# Patient Record
Sex: FEMALE | Race: WHITE | Employment: FULL TIME | URBAN - METROPOLITAN AREA
[De-identification: names, ages, dates, MRNs, and addresses within clinical notes are randomized per-mention and may not be internally consistent; named-entity substitution may affect disease eponyms.]

---

## 2022-06-22 ENCOUNTER — OFFICE VISIT (OUTPATIENT)
Dept: NEUROLOGY | Age: 37
End: 2022-06-22
Payer: COMMERCIAL

## 2022-06-22 VITALS
DIASTOLIC BLOOD PRESSURE: 80 MMHG | WEIGHT: 202 LBS | SYSTOLIC BLOOD PRESSURE: 111 MMHG | HEIGHT: 66 IN | BODY MASS INDEX: 32.47 KG/M2 | HEART RATE: 78 BPM

## 2022-06-22 DIAGNOSIS — G08 THROMBOSIS OF SUPERIOR SAGITTAL SINUS: ICD-10-CM

## 2022-06-22 DIAGNOSIS — G93.2 PSEUDOTUMOR CEREBRI: Primary | ICD-10-CM

## 2022-06-22 PROCEDURE — 99204 OFFICE O/P NEW MOD 45 MIN: CPT | Performed by: PSYCHIATRY & NEUROLOGY

## 2022-06-22 RX ORDER — ACETAZOLAMIDE 500 MG/1
CAPSULE, EXTENDED RELEASE ORAL
COMMUNITY
Start: 2022-06-21 | End: 2022-08-24

## 2022-06-22 RX ORDER — PREDNISONE 20 MG/1
TABLET ORAL
Qty: 17 TABLET | Refills: 0 | Status: SHIPPED | OUTPATIENT
Start: 2022-06-22 | End: 2022-08-24

## 2022-06-22 ASSESSMENT — ENCOUNTER SYMPTOMS
GASTROINTESTINAL NEGATIVE: 1
ALLERGIC/IMMUNOLOGIC NEGATIVE: 1

## 2022-06-22 NOTE — PROGRESS NOTES
[address], Ash[address], [address]  Phone: [phone] Fax [phone]  Dr. Lavern Hurst      6/22/2022  Erlinda Hicks     Patient is referred by the following provider for consultation regarding as below:       I reviewed the available records and notes and have examined patient with the following findings:     Chief Complaint:  Chief Complaint   Patient presents with   [facility] Care    Neurologic Problem     Papilledema          HPI: This is a right handed 40 y.o.  female who is very pleasant very appropriate very frustrated. The patient unfortunately in October 2021 she had a breast reduction. And about a week or 2 after that she started noticing she was having headaches she thought it was associated with some of the medication she had postoperatively. But it did not seem to be so when she stopped those medicines the headache continued right behind the right eye severe pain deep sharp and spreads right over the right side of her head to the right occipital region. She went to see her primary doctor they did a CT scan tried her on some antibiotics with some benefit but overall not much and went to Dr. Alicia Cross her optometrist who quickly identified she had bilateral severe papilledema the right side was much worse than the left but it was bilaterally sent her to [facility] eye group Dr. Brian Fatima went ahead and started her on Diamox 500 mg twice a day took a little bit of time but then it started resolving her headaches. In April they decided to wean off taupe Diamox and within 2 weeks she started getting headaches back on the right side and over time they are creeping over the left side at that time they did not know whether this was part of pseudotumor they did try Imitrex 50 mg which did not help. They put her back on Diamox but this time it took a couple weeks before it started kicking in and helping.   But she also started having had discomfort radiating over to the left side of her head is just the left side of her head is nowhere near as severe as the right side was. When the headaches were severe on the right side she had to go into a dark room she had photophobia no phonophobia. No nausea or vomiting. She was laying in a dark room. Now with everything shifting over the left softly but not anywhere near to that degree. IMAGING REVIEW:  I REVIEWED PERTINENT  IMAGES AND REPORTS WITH THE PATIENT PERSONALLY, DIRECTLY AND FULLY. Past Medical History:  No past medical history on file. Past Surgical History:  No past surgical history on file. Social History:  Social History     Socioeconomic History    Marital status:      Spouse name: Not on file    Number of children: Not on file    Years of education: Not on file    Highest education level: Not on file   Occupational History    Not on file   Tobacco Use    Smoking status: Not on file    Smokeless tobacco: Not on file   Substance and Sexual Activity    Alcohol use: Not on file    Drug use: Not on file    Sexual activity: Not on file   Other Topics Concern    Not on file   Social History Narrative    Not on file     Social Determinants of Health     Financial Resource Strain:     Difficulty of Paying Living Expenses: Not on file   Food Insecurity:     Worried About Running Out of Food in the Last Year: Not on file    [place] of Food in the Last Year: Not on file   Transportation Needs:     Lack of Transportation (Medical): Not on file    Lack of Transportation (Non-Medical):  Not on file   Physical Activity:     Days of Exercise per Week: Not on file    Minutes of Exercise per Session: Not on file   Stress:     Feeling of Stress : Not on file   Social Connections:     Frequency of Communication with Friends and Family: Not on file    Frequency of Social Gatherings with Friends and Family: Not on file    Attends Evangelical Services: Not on file   [employer] of Clubs or Organizations: Not on file    Attends Club or Organization Meetings: Not on file    Marital Status: Not on file   Intimate Partner Violence:     Fear of Current or Ex-Partner: Not on file    Emotionally Abused: Not on file    Physically Abused: Not on file    Sexually Abused: Not on file   Housing Stability:     Unable to Pay for Housing in the Last Year: Not on file    Number of [place] in the Last Year: Not on file    Unstable Housing in the Last Year: Not on file       Family History:   No family history on file. Current Outpatient Medications on File Prior to Visit   Medication Sig Dispense Refill    acetaZOLAMIDE (DIAMOX) 500 MG extended release capsule Take 1 cap BID      medroxyPROGESTERone Acetate (DEPO-PROVERA IM) Inject into the muscle Q 3 months       No current facility-administered medications on file prior to visit. Allergies   Allergen Reactions    Penicillins Other (See Comments)     Can take Z-pack ok  Can take Z-pack ok         Review of Systems:  Review of Systems   Constitutional: Negative. HENT: Negative. Eyes: Positive for visual disturbance. Cardiovascular: Negative. Gastrointestinal: Negative. Endocrine: Negative. Genitourinary: Negative. Musculoskeletal: Negative. Skin: Negative. Allergic/Immunologic: Negative. Neurological: Positive for headaches. Hematological: Negative. Psychiatric/Behavioral: Negative. No flowsheet data found. No flowsheet data found. Vitals:    06/22/22 0857   BP: 111/80   Site: Left Upper Arm   Position: Sitting   Pulse: 78   Weight: 202 lb (91.6 kg)   Height: 5' 6\" (1.676 m)        Physical Exam  Constitutional:       Appearance: Normal appearance. HENT:      Head: Normocephalic and atraumatic. Eyes:      Extraocular Movements: Extraocular movements intact and EOM normal.      Pupils: Pupils are equal, round, and reactive to light.    Cardiovascular:      Rate and Rhythm: Normal rate and regular rhythm. Pulses: Normal pulses. Pulmonary:      Effort: Pulmonary effort is normal.   Abdominal:      General: Abdomen is flat. Neurological:      Mental Status: She is alert and oriented to person, place, and time. Gait: Gait is intact. Deep Tendon Reflexes:      Reflex Scores:       Tricep reflexes are 1+ on the right side and 1+ on the left side. Bicep reflexes are 1+ on the right side and 1+ on the left side. Brachioradialis reflexes are 1+ on the right side and 1+ on the left side. Patellar reflexes are 1+ on the right side and 1+ on the left side. Achilles reflexes are 1+ on the right side and 1+ on the left side. Neurologic Exam     Mental Status   Oriented to person, place, and time. Attention: normal. Concentration: normal.   Level of consciousness: alert  Knowledge: good. Cranial Nerves     CN II   Visual fields full to confrontation. CN III, IV, VI   Pupils are equal, round, and reactive to light. Extraocular motions are normal.     CN VII   Facial expression full, symmetric. I cannot appreciate a funduscopic exam to the back of her eyes the pupils shut down quickly     Motor Exam   Right arm tone: normal  Left arm tone: normal  Right leg tone: normal  Left leg tone: normal    Gait, Coordination, and Reflexes     Gait  Gait: normal    Tremor   Resting tremor: absent  Intention tremor: absent  Action tremor: absent    Reflexes   Right brachioradialis: 1+  Left brachioradialis: 1+  Right biceps: 1+  Left biceps: 1+  Right triceps: 1+  Left triceps: 1+  Right patellar: 1+  Left patellar: 1+  Right achilles: 1+  Left achilles: 1+          Assessment   Assessment / Plan:    Niharika Morel was seen today for establish care and neurologic problem. Diagnoses and all orders for this visit:    Pseudotumor cerebri the patient clearly has pseudotumor cerebri.   The ophthalmologic evaluation showed bilateral papilledema she had headaches on the right side of her head and right behind the right eye. Diamox she responded to initially and the second time it took little bit longer. Now she has a new discomfort on the left side of her head that is headache and pain but it is different than the pseudotumor pain. This simply may not even be associated with the pseudotumor pain. At this time I Zaire Lopes hit her with steroids to try to break this left-sided head discomfort. She is going to immediately get together with her ophthalmologist and have her eyes checked to make sure the papilledema is no longer there but if it is they are working to move forward with a spinal tap. An MRV would be greatly of benefit to make sure there is no sagittal sinus thrombosis. Other orders  -     predniSONE (DELTASONE) 20 MG tablet; Take 3 po q day for 3 days than 2 po q day for 3 days than 1 po q day for 2 days than stop        The Diagnosis and differential diagnostic considerations, and Rx Tx were reviewed with the patient at length. No orders of the defined types were placed in this encounter. I have spent greater than 50% of visit discussing and counseling of patient  for treatment and diagnostic plan review. Total time 45 min     . Notes: Patient is to continue all medications as directed by prescribing physicians. Continuations on today's visit are made based on the patient's report of current medications.              Dr. Mp Mora  Consultation Neurology, Neurodiagnostics and Neurotherapeutics  Neuroelectrophysiology, EEG, EMG  [facility]  [address] [place] [address], [address] [facility]  Phone:  [phone]  Fax:   [phone]

## 2022-07-05 ENCOUNTER — HOSPITAL ENCOUNTER (OUTPATIENT)
Dept: MRI IMAGING | Age: 37
Discharge: HOME | End: 2022-07-08

## 2022-07-05 DIAGNOSIS — G08 THROMBOSIS OF SUPERIOR SAGITTAL SINUS: ICD-10-CM

## 2022-07-05 DIAGNOSIS — G93.2 PSEUDOTUMOR CEREBRI: ICD-10-CM

## 2022-07-14 ENCOUNTER — CLINICAL DOCUMENTATION (OUTPATIENT)
Dept: NEUROLOGY | Age: 37
End: 2022-07-14

## 2022-07-14 NOTE — PROGRESS NOTES
The patient did drop off the MRI done at [facility] on date 12- showing a partially empty sella mildly bulging retinal papillae and a small anterior temporal lobe encephalocele.   The MRI was put into the box
NP

## 2022-07-29 ENCOUNTER — OFFICE VISIT (OUTPATIENT)
Dept: NEUROLOGY | Age: 37
End: 2022-07-29
Payer: COMMERCIAL

## 2022-07-29 DIAGNOSIS — G89.29 CHRONIC NONINTRACTABLE HEADACHE, UNSPECIFIED HEADACHE TYPE: ICD-10-CM

## 2022-07-29 DIAGNOSIS — G93.2 PSEUDOTUMOR CEREBRI: Primary | ICD-10-CM

## 2022-07-29 DIAGNOSIS — R51.9 CHRONIC NONINTRACTABLE HEADACHE, UNSPECIFIED HEADACHE TYPE: ICD-10-CM

## 2022-07-29 PROCEDURE — 99213 OFFICE O/P EST LOW 20 MIN: CPT | Performed by: PSYCHIATRY & NEUROLOGY

## 2022-07-29 RX ORDER — TOPIRAMATE 25 MG/1
TABLET ORAL
Qty: 120 TABLET | Refills: 9 | Status: SHIPPED | OUTPATIENT
Start: 2022-07-29

## 2022-07-29 RX ORDER — RIZATRIPTAN BENZOATE 10 MG/1
10 TABLET, ORALLY DISINTEGRATING ORAL
Qty: 9 TABLET | Refills: 9 | Status: SHIPPED | OUTPATIENT
Start: 2022-07-29 | End: 2022-08-24

## 2022-07-29 ASSESSMENT — ENCOUNTER SYMPTOMS
RESPIRATORY NEGATIVE: 1
GASTROINTESTINAL NEGATIVE: 1

## 2022-07-29 NOTE — PROGRESS NOTES
[address], [address], [address]  Phone: [phone] Fax [phone]  Dr. Taryn Stevens      7/29/2022  Elzbieta Reagan     Patient is referred by the following provider for consultation regarding as below:       I reviewed the available records and notes and have examined patient with the following findings:     Chief Complaint:  No chief complaint on file. HPI: This is a right handed 40 y.o. female the patient is a very pleasant very appropriate patient who in October 2021 she had a breast reduction in about 2 weeks after that she started getting headaches. Then she started getting right eye right head and right occipital head region headaches. She went to her primary doctor was treated with antibiotics and she did well but was sent to an optometrist who identified bilateral papilledema right much worse than the left. She was sent to [facility] eye [facility] Dr. Marko Segura evaluated started Diamox 500 mg twice a day but she could only tolerate once a day. And the headaches resolved. In April she weaned off Diamox the headaches returned into and it took 2 weeks after restarting him for that to work. Photophobia phonophobia she has to go into a dark room Imitrex did not help her. 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 But no dural or dural venous thrombosis. This certainly can be associated with pseudotumor as well but we still have not gotten the spinal tap because she is resolved for many of her symptoms repetitively. She was seen at [facility] on 6/22/2022 and I will obtain those records but her headaches only returned about 2 weeks ago. Again no visual changes at the moment. She is understanding that if she gets visual changes we need to be reevaluated by the eye doctor immediately. And if there is papilledema we are going to move forward with the spinal tap.     IMAGING REVIEW: I REVIEWED PERTINENT  IMAGES AND REPORTS WITH THE PATIENT PERSONALLY, DIRECTLY AND FULLY. Past Medical History:  No past medical history on file. Past Surgical History:  No past surgical history on file. Social History:  Social History     Socioeconomic History    Marital status:      Spouse name: Not on file    Number of children: Not on file    Years of education: Not on file    Highest education level: Not on file   Occupational History    Not on file   Tobacco Use    Smoking status: Not on file    Smokeless tobacco: Not on file   Substance and Sexual Activity    Alcohol use: Not on file    Drug use: Not on file    Sexual activity: Not on file   Other Topics Concern    Not on file   Social History Narrative    Not on file     Social Determinants of Health     Financial Resource Strain: Not on file   Food Insecurity: Not on file   Transportation Needs: Not on file   Physical Activity: Not on file   Stress: Not on file   Social Connections: Not on file   Intimate Partner Violence: Not on file   Housing Stability: Not on file       Family History:   No family history on file. Current Outpatient Medications on File Prior to Visit   Medication Sig Dispense Refill    acetaZOLAMIDE (DIAMOX) 500 MG extended release capsule Take 1 cap BID      medroxyPROGESTERone Acetate (DEPO-PROVERA IM) Inject into the muscle Q 3 months      predniSONE (DELTASONE) 20 MG tablet Take 3 po q day for 3 days than 2 po q day for 3 days than 1 po q day for 2 days than stop 17 tablet 0     No current facility-administered medications on file prior to visit. Allergies   Allergen Reactions    Penicillins Other (See Comments)     Can take Z-pack ok  Can take Z-pack ok         Review of Systems:  Review of Systems   Constitutional: Negative. HENT: Negative. Eyes:  Positive for visual disturbance. Respiratory: Negative. Cardiovascular: Negative. Gastrointestinal: Negative. Endocrine: Negative. Genitourinary: Negative. Musculoskeletal: Negative. Skin: Negative. Neurological:  Positive for headaches. Psychiatric/Behavioral: Negative. No flowsheet data found. No flowsheet data found. There were no vitals filed for this visit. Physical Exam  Constitutional:       Appearance: Normal appearance. HENT:      Head: Normocephalic and atraumatic. Eyes:      Extraocular Movements: Extraocular movements intact and EOM normal.      Pupils: Pupils are equal, round, and reactive to light. Cardiovascular:      Rate and Rhythm: Normal rate and regular rhythm. Pulses: Normal pulses. Pulmonary:      Effort: Pulmonary effort is normal.   Abdominal:      Palpations: Abdomen is soft. Neurological:      Mental Status: She is alert and oriented to person, place, and time. Gait: Gait is intact. Deep Tendon Reflexes:      Reflex Scores:       Tricep reflexes are 1+ on the right side and 1+ on the left side. Bicep reflexes are 1+ on the right side and 1+ on the left side. Brachioradialis reflexes are 1+ on the right side and 1+ on the left side. Patellar reflexes are 1+ on the right side and 1+ on the left side. Achilles reflexes are 1+ on the right side and 1+ on the left side. Neurologic Exam     Mental Status   Oriented to person, place, and time. Attention: normal. Concentration: normal.   Level of consciousness: alert  Knowledge: good. Cranial Nerves     CN II   Visual fields full to confrontation. CN III, IV, VI   Pupils are equal, round, and reactive to light. Extraocular motions are normal.     CN VII   Facial expression full, symmetric.      Motor Exam   Right arm tone: normal  Left arm tone: normal  Right leg tone: normal  Left leg tone: normal    Gait, Coordination, and Reflexes     Gait  Gait: normal    Tremor   Resting tremor: absent  Intention tremor: absent  Action tremor: absent    Reflexes   Right brachioradialis: 1+  Left brachioradialis: 1+  Right biceps: 1+  Left biceps: 1+  Right triceps: 1+  Left triceps: 1+  Right patellar: 1+  Left patellar: 1+  Right achilles: 1+  Left achilles: 1+        Assessment   Assessment / Plan:    Diagnoses and all orders for this visit:    Pseudotumor cerebri we will need to obtain records from her evaluation from [facility] on 6-. But it was only a week ago to 2 weeks ago that she started getting her headaches back with out visual changes. At this time we are going to hold off on the spinal tap unless she starts getting visual changes she is on Diamox 500 mg once a day I am going to add Topamax 25 mg twice a day for 3 weeks and then 2 twice a day. Were also going to call in [address]. It is difficult to discern whether this is a pseudotumor headache or whether this is just a routine headache. Abs of so far we have not spinal tapped her she is on Diamox 500 mg once a day because she cannot tolerate twice a day. The MRV does show a right transverse and sigmoid sinus high-grade stenosis but its only on the right. We may consider an evaluation endovascularly in the future if she starts getting visual changes and if the pressures are elevated in her CSF. Cost has a lot to do with medication use at the moment. Chronic nonintractable headache, unspecified headache type    Other orders  -     rizatriptan (MAXALT-MLT) 10 MG disintegrating tablet; Take 1 tablet by mouth once as needed for Migraine May repeat in 2 hours if needed  -     topiramate (TOPAMAX) 25 MG tablet; Take 1 bid for 3 weeks than 2 bid        The Diagnosis and differential diagnostic considerations, and Rx Tx were reviewed with the patient at length. No orders of the defined types were placed in this encounter. I have spent greater than 50% of visit discussing and counseling of patient  for treatment and diagnostic plan review. Total time30 min     .       Notes: Patient is to continue all medications as directed by prescribing physicians. Continuations on today's visit are made based on the patient's report of current medications.              Dr. Angelito Coppola  Consultation Neurology, Neurodiagnostics and Neurotherapeutics  Neuroelectrophysiology, EEG, EMG  [facility]  [address] [place] [address], [address] [place] [address]  Phone:  [phone]  Fax:   [phone]

## 2022-08-01 ENCOUNTER — TELEPHONE (OUTPATIENT)
Dept: NEUROLOGY | Age: 37
End: 2022-08-01

## 2022-08-01 NOTE — TELEPHONE ENCOUNTER
Left message to schedule a 1- 1 1/2 month follow up, held 9/26 @ 12:30 PM, with Dr. Bhargavi Jacobs.

## 2022-08-01 NOTE — TELEPHONE ENCOUNTER
----- Message from Gage Ford DO sent at 7/29/2022  7:20 AM EDT -----  Regarding: fu  She will need a follow-up appointment in about a month and 1/2 to 2 months.

## 2022-08-24 ENCOUNTER — OFFICE VISIT (OUTPATIENT)
Dept: NEUROLOGY | Age: 37
End: 2022-08-24
Payer: COMMERCIAL

## 2022-08-24 VITALS
HEIGHT: 66 IN | HEART RATE: 100 BPM | WEIGHT: 200 LBS | BODY MASS INDEX: 32.14 KG/M2 | SYSTOLIC BLOOD PRESSURE: 131 MMHG | DIASTOLIC BLOOD PRESSURE: 83 MMHG

## 2022-08-24 DIAGNOSIS — G93.2 PSEUDOTUMOR CEREBRI: Primary | ICD-10-CM

## 2022-08-24 PROCEDURE — 99214 OFFICE O/P EST MOD 30 MIN: CPT | Performed by: PSYCHIATRY & NEUROLOGY

## 2022-08-24 ASSESSMENT — ENCOUNTER SYMPTOMS
RESPIRATORY NEGATIVE: 1
ALLERGIC/IMMUNOLOGIC NEGATIVE: 1
GASTROINTESTINAL NEGATIVE: 1

## 2022-08-24 NOTE — PROGRESS NOTES
[address], [address]  [facility], [address]  Phone: [phone] Fax [phone]  Dr. Janey Vang      8/24/2022  Candy Stone     Patient is referred by the following provider for consultation regarding as below:       I reviewed the available records and notes and have examined patient with the following findings:     Chief Complaint:  Chief Complaint   Patient presents with    Neurologic Problem     Vision changes, migraines          HPI: This is a right handed 40 y.o. female who is a very pleasant very appropriate patient who unfortunately in October 2021 after breast reduction she started having severe headaches and right eye pain. She was identified as having bilateral papilledema right worse than the left by her ophthalmologist to [facility] eye Dr. Oscar Jenkins. Diamox 500 mg twice a day was started but she had to drop to once a day and she can tolerate that. In April she weaned off of the Diamox headaches returned she went back on the Diamox and it headaches resolved. There was photophobia and phonophobia associated with it. 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 We wanted her back seen by [facility] eye and there was no visual changes. We started her us on 7- on Topamax 25 mg again up to 2 twice a day just a day or 2 ago. Unfortunately she had stopped her Diamox 500 mg once a day that she was tolerating but we did want her to continue that. We also gave her Maxalt because we were not sure if these were migraines versus pseudotumor combination headaches. She was not having any more visual field changes. Imitrex never helped her. She states just about 2-1/2 to 3 weeks ago which was really shortly after I saw her the headaches became constant Maxalt seems to help just briefly.   And yesterday and today she had these brief moments of the visual change that may or may not be associated with papilledema at this time. IMAGING REVIEW:  I REVIEWED PERTINENT  IMAGES AND REPORTS WITH THE PATIENT PERSONALLY, DIRECTLY AND FULLY. Past Medical History:  No past medical history on file. Past Surgical History:  No past surgical history on file. Social History:  Social History     Socioeconomic History    Marital status:      Spouse name: Not on file    Number of children: Not on file    Years of education: Not on file    Highest education level: Not on file   Occupational History    Not on file   Tobacco Use    Smoking status: Not on file    Smokeless tobacco: Not on file   Substance and Sexual Activity    Alcohol use: Not on file    Drug use: Not on file    Sexual activity: Not on file   Other Topics Concern    Not on file   Social History Narrative    Not on file     Social Determinants of Health     Financial Resource Strain: Not on file   Food Insecurity: Not on file   Transportation Needs: Not on file   Physical Activity: Not on file   Stress: Not on file   Social Connections: Not on file   Intimate Partner Violence: Not on file   Housing Stability: Not on file       Family History:   No family history on file. Current Outpatient Medications on File Prior to Visit   Medication Sig Dispense Refill    rizatriptan (MAXALT-MLT) 10 MG disintegrating tablet Take 1 tablet by mouth once as needed for Migraine May repeat in 2 hours if needed 9 tablet 9    topiramate (TOPAMAX) 25 MG tablet Take 1 bid for 3 weeks than 2 bid (Patient taking differently: 2 bid) 120 tablet 9    medroxyPROGESTERone Acetate (DEPO-PROVERA IM) Inject into the muscle Q 3 months       No current facility-administered medications on file prior to visit. Allergies   Allergen Reactions    Penicillins Other (See Comments)     Can take Z-pack ok  Can take Z-pack ok         Review of Systems:  Review of Systems   Constitutional: Negative. HENT: Negative. Eyes:  Positive for visual disturbance. Respiratory: Negative. Cardiovascular: Negative. Gastrointestinal: Negative. Endocrine: Negative. Genitourinary: Negative. Musculoskeletal: Negative. Skin: Negative. Allergic/Immunologic: Negative. Neurological:  Positive for headaches. Psychiatric/Behavioral: Negative. No flowsheet data found. No flowsheet data found. Vitals:    08/24/22 0912   BP: 131/83   Site: Left Upper Arm   Position: Sitting   Pulse: 100   Weight: 200 lb (90.7 kg)   Height: 5' 6\" (1.676 m)        Physical Exam  Constitutional:       Appearance: Normal appearance. HENT:      Head: Normocephalic and atraumatic. Eyes:      Extraocular Movements: Extraocular movements intact and EOM normal.      Pupils: Pupils are equal, round, and reactive to light. Cardiovascular:      Rate and Rhythm: Normal rate. Pulses: Normal pulses. Pulmonary:      Effort: Pulmonary effort is normal.   Abdominal:      General: Abdomen is flat. Neurological:      Mental Status: She is alert and oriented to person, place, and time. Gait: Gait is intact. Neurologic Exam     Mental Status   Oriented to person, place, and time. Attention: normal. Concentration: normal.   Level of consciousness: alert  Knowledge: good. Cranial Nerves     CN II   Visual fields full to confrontation. CN III, IV, VI   Pupils are equal, round, and reactive to light. Extraocular motions are normal.     CN VII   Facial expression full, symmetric. Motor Exam   Right arm tone: normal  Left arm tone: normal  Right leg tone: normal  Left leg tone: normal    Gait, Coordination, and Reflexes     Gait  Gait: normal    Tremor   Resting tremor: absent  Intention tremor: absent  Action tremor: absent        Assessment   Assessment / Plan:    Courtney Hollis was seen today for neurologic problem. Diagnoses and all orders for this visit:    Pseudotumor cerebri at the time it is time we absolutely need to get the pressure levels of her brain.   I need to make sure the diagnosis is correct. I suspected absolutely he has. Somehow she discontinued her Diamox thinking we are changing her to Topamax even though she tolerated Diamox 500 mg once a day very well. It was the twice a day she could not. So she is going to restart Diamox immediately today. Continue the Topamax 25 mg 2 twice a day that she just started. Working get a spinal tap. If her pressures are elevated then I will make a referral over the endovascular team due to the transverse and sigmoid sinus stenosis. If her pressures are not high then would not have to try something to break her headaches. The Diagnosis and differential diagnostic considerations, and Rx Tx were reviewed with the patient at length. No orders of the defined types were placed in this encounter. I have spent greater than 50% of visit discussing and counseling of patient  for treatment and diagnostic plan review. Total time 30 min     . Notes: Patient is to continue all medications as directed by prescribing physicians. Continuations on today's visit are made based on the patient's report of current medications.              Dr. Ortega Alvarenga  Consultation Neurology, Neurodiagnostics and Neurotherapeutics  Neuroelectrophysiology, EEG, EMG  [facility] Neurology  [address] [place] [address], [address] [facility]  Phone:  [phone]  Fax:   [phone]

## 2022-08-26 ENCOUNTER — HOSPITAL ENCOUNTER (OUTPATIENT)
Dept: INTERVENTIONAL RADIOLOGY/VASCULAR | Age: 37
Discharge: HOME | End: 2022-08-29
Payer: COMMERCIAL

## 2022-08-26 VITALS
TEMPERATURE: 98.3 F | RESPIRATION RATE: 18 BRPM | SYSTOLIC BLOOD PRESSURE: 199 MMHG | HEART RATE: 82 BPM | DIASTOLIC BLOOD PRESSURE: 69 MMHG

## 2022-08-26 DIAGNOSIS — G93.2 PSEUDOTUMOR CEREBRI: ICD-10-CM

## 2022-08-26 LAB
APPEARANCE FLD: CLEAR
APPEARANCE FLD: CLEAR
COLOR FLD: COLORLESS
COLOR FLD: COLORLESS
NUC CELL # FLD: 1 /CU MM
NUC CELL # FLD: 1 /CU MM
PROT CSF-MCNC: 30 MG/DL (ref 15–45)
RBC # FLD: 0 /CU MM
RBC # FLD: 44 /CU MM
SPECIMEN SOURCE FLD: NORMAL
SPECIMEN SOURCE FLD: NORMAL
TUBE # CSF: NORMAL

## 2022-08-26 PROCEDURE — 84157 ASSAY OF PROTEIN OTHER: CPT

## 2022-08-26 PROCEDURE — 62328 DX LMBR SPI PNXR W/FLUOR/CT: CPT

## 2022-08-26 PROCEDURE — 2500000003 HC RX 250 WO HCPCS: Performed by: PHYSICIAN ASSISTANT

## 2022-08-26 PROCEDURE — 82040 ASSAY OF SERUM ALBUMIN: CPT

## 2022-08-26 PROCEDURE — 89050 BODY FLUID CELL COUNT: CPT

## 2022-08-26 RX ORDER — LIDOCAINE HYDROCHLORIDE 20 MG/ML
INJECTION, SOLUTION INFILTRATION; PERINEURAL
Status: COMPLETED | OUTPATIENT
Start: 2022-08-26 | End: 2022-08-26

## 2022-08-26 RX ADMIN — LIDOCAINE HYDROCHLORIDE 5 ML: 20 INJECTION, SOLUTION INFILTRATION; PERINEURAL at 13:54

## 2022-08-26 ASSESSMENT — PAIN DESCRIPTION - ONSET: ONSET: ON-GOING

## 2022-08-26 ASSESSMENT — PAIN SCALES - GENERAL: PAINLEVEL_OUTOF10: 2

## 2022-08-26 ASSESSMENT — PAIN DESCRIPTION - ORIENTATION: ORIENTATION: RIGHT

## 2022-08-26 ASSESSMENT — PAIN DESCRIPTION - FREQUENCY: FREQUENCY: INTERMITTENT

## 2022-08-26 ASSESSMENT — PAIN DESCRIPTION - LOCATION: LOCATION: HEAD

## 2022-08-26 ASSESSMENT — PAIN DESCRIPTION - PAIN TYPE: TYPE: CHRONIC PAIN

## 2022-08-26 ASSESSMENT — PAIN - FUNCTIONAL ASSESSMENT: PAIN_FUNCTIONAL_ASSESSMENT: PREVENTS OR INTERFERES WITH MANY ACTIVE NOT PASSIVE ACTIVITIES

## 2022-08-26 ASSESSMENT — PAIN DESCRIPTION - DESCRIPTORS: DESCRIPTORS: PRESSURE

## 2022-08-26 NOTE — OR NURSING
Recovery period without difficulty. Pt alert and oriented and denies pain. Dressing is clean, dry, and intact. Reviewed discharge instructions with patient and family member, both verbalized understanding. Pt declined wheelchair. Ambulatory off the unit with no distress. Vital signs and Yan score completed.

## 2022-08-26 NOTE — PROGRESS NOTES
TRANSFER - OUT REPORT:    Verbal report given to Dave Saenz RN on René Cuevas  being transferred to IR prep room 2 for routine post-op       Report consisted of patient's Situation, Background, Assessment and   Recommendations(SBAR). Information from the following report(s) Surgery Report and MAR was reviewed with the receiving nurse. Lines:       Opportunity for questions and clarification was provided. 28 cc of CSF removed in procedure.

## 2022-08-26 NOTE — DISCHARGE INSTRUCTIONS
[facility]     Department of Interventional Radiology     Phone Number: [phone]     Fax: [phone]         If you have any questions about your procedure, please call the Interventional Radiology department at [phone]. After business hours (5pm) and weekends, call the answering service at [phone] and ask for the Radiologist on call to be paged. Si tiene Preguntas acerca del procedimiento, por favor llame al departamento de Radiología Intervencional al [phone]. Después de horas de oficina (5 pm) y los fines de [place], llamar al Edis Jennings al [phone] y pregunte por el Radiologo de [facility].

## 2022-08-29 ENCOUNTER — CLINICAL DOCUMENTATION (OUTPATIENT)
Dept: NEUROLOGY | Age: 37
End: 2022-08-29

## 2022-08-29 DIAGNOSIS — G93.2 PSEUDOTUMOR CEREBRI: Primary | ICD-10-CM

## 2022-08-29 NOTE — PROGRESS NOTES
I spoke with the patient she is at work but she still has pressure at the skull of her base base of her skull as well as while she is sitting up that goes away when she lays down which is probably a spinal headache but it is mild. If she wants to continue working did write this out. If we need to get a blood patch we will deal with that at that time. But her CSF pressures were very high and we can move forward with endovascular evaluation at [facility].

## 2022-08-30 LAB
ALB CSF/SERPL: 3 {RATIO} (ref 0–8)
ALBUMIN CSF-MCNC: 14 MG/DL (ref 7–29)
ALBUMIN SERPL-MCNC: 4.4 G/DL (ref 3.8–4.8)
IGG CSF-MCNC: 1.9 MG/DL (ref 0–6.7)
IGG SERPL-MCNC: 1214 MG/DL (ref 586–1602)
IGG SYNTH RATE SER+CSF CALC-MRATE: NORMAL MG/DAY
IGG/ALB CLEAR SER+CSF-RTO: 0.5 (ref 0–0.7)
IGG/ALB CSF: 0.14 {RATIO} (ref 0–0.25)
OLIGOCLONAL BANDS.IT SER+CSF QL: NORMAL

## 2024-01-02 ENCOUNTER — OFFICE VISIT (OUTPATIENT)
Dept: NEUROLOGY | Age: 39
End: 2024-01-02
Payer: COMMERCIAL

## 2024-01-02 VITALS
OXYGEN SATURATION: 98 % | SYSTOLIC BLOOD PRESSURE: 129 MMHG | DIASTOLIC BLOOD PRESSURE: 84 MMHG | HEIGHT: 66 IN | HEART RATE: 68 BPM | WEIGHT: 198.2 LBS | BODY MASS INDEX: 31.85 KG/M2

## 2024-01-02 DIAGNOSIS — R55 SYNCOPE AND COLLAPSE: ICD-10-CM

## 2024-01-02 DIAGNOSIS — R29.818 SUSPECTED SLEEP APNEA: ICD-10-CM

## 2024-01-02 DIAGNOSIS — G93.2 PSEUDOTUMOR CEREBRI: ICD-10-CM

## 2024-01-02 DIAGNOSIS — K30 INDIGESTION: ICD-10-CM

## 2024-01-02 DIAGNOSIS — R00.2 PALPITATIONS: ICD-10-CM

## 2024-01-02 DIAGNOSIS — R55 SYNCOPE AND COLLAPSE: Primary | ICD-10-CM

## 2024-01-02 PROBLEM — H47.10 PAPILLEDEMA: Status: ACTIVE | Noted: 2022-10-25

## 2024-01-02 PROBLEM — F41.0 PANIC ATTACK: Status: ACTIVE | Noted: 2017-03-23

## 2024-01-02 PROBLEM — F41.1 ANXIETY STATE: Status: ACTIVE | Noted: 2017-03-23

## 2024-01-02 LAB
ALBUMIN SERPL-MCNC: 3.8 G/DL (ref 3.5–5)
ALBUMIN/GLOB SERPL: 1.1 (ref 0.4–1.6)
ALP SERPL-CCNC: 57 U/L (ref 50–136)
ALT SERPL-CCNC: 17 U/L (ref 12–65)
ANION GAP SERPL CALC-SCNC: 4 MMOL/L (ref 2–11)
AST SERPL-CCNC: 15 U/L (ref 15–37)
BASOPHILS # BLD: 0 K/UL (ref 0–0.2)
BASOPHILS NFR BLD: 1 % (ref 0–2)
BILIRUB SERPL-MCNC: 0.3 MG/DL (ref 0.2–1.1)
BUN SERPL-MCNC: 17 MG/DL (ref 6–23)
CALCIUM SERPL-MCNC: 8.8 MG/DL (ref 8.3–10.4)
CHLORIDE SERPL-SCNC: 113 MMOL/L (ref 103–113)
CO2 SERPL-SCNC: 21 MMOL/L (ref 21–32)
CREAT SERPL-MCNC: 0.8 MG/DL (ref 0.6–1)
DIFFERENTIAL METHOD BLD: NORMAL
EOSINOPHIL # BLD: 0.1 K/UL (ref 0–0.8)
EOSINOPHIL NFR BLD: 1 % (ref 0.5–7.8)
ERYTHROCYTE [DISTWIDTH] IN BLOOD BY AUTOMATED COUNT: 12.2 % (ref 11.9–14.6)
GLOBULIN SER CALC-MCNC: 3.6 G/DL (ref 2.8–4.5)
GLUCOSE SERPL-MCNC: 96 MG/DL (ref 65–100)
HCT VFR BLD AUTO: 42.8 % (ref 35.8–46.3)
HGB BLD-MCNC: 13.8 G/DL (ref 11.7–15.4)
IMM GRANULOCYTES # BLD AUTO: 0 K/UL (ref 0–0.5)
IMM GRANULOCYTES NFR BLD AUTO: 0 % (ref 0–5)
LYMPHOCYTES # BLD: 2 K/UL (ref 0.5–4.6)
LYMPHOCYTES NFR BLD: 31 % (ref 13–44)
MCH RBC QN AUTO: 30.2 PG (ref 26.1–32.9)
MCHC RBC AUTO-ENTMCNC: 32.2 G/DL (ref 31.4–35)
MCV RBC AUTO: 93.7 FL (ref 82–102)
MONOCYTES # BLD: 0.5 K/UL (ref 0.1–1.3)
MONOCYTES NFR BLD: 7 % (ref 4–12)
NEUTS SEG # BLD: 3.9 K/UL (ref 1.7–8.2)
NEUTS SEG NFR BLD: 60 % (ref 43–78)
NRBC # BLD: 0 K/UL (ref 0–0.2)
PLATELET # BLD AUTO: 261 K/UL (ref 150–450)
PMV BLD AUTO: 10.9 FL (ref 9.4–12.3)
POTASSIUM SERPL-SCNC: 4.1 MMOL/L (ref 3.5–5.1)
PROT SERPL-MCNC: 7.4 G/DL (ref 6.3–8.2)
RBC # BLD AUTO: 4.57 M/UL (ref 4.05–5.2)
SODIUM SERPL-SCNC: 138 MMOL/L (ref 136–146)
TSH W FREE THYROID IF ABNORMAL: 1.76 UIU/ML (ref 0.36–3.74)
WBC # BLD AUTO: 6.5 K/UL (ref 4.3–11.1)

## 2024-01-02 PROCEDURE — 99215 OFFICE O/P EST HI 40 MIN: CPT | Performed by: NURSE PRACTITIONER

## 2024-01-02 RX ORDER — ACETAZOLAMIDE 500 MG/1
500 CAPSULE, EXTENDED RELEASE ORAL DAILY
COMMUNITY
Start: 2022-01-01

## 2024-01-02 RX ORDER — PANTOPRAZOLE SODIUM 20 MG/1
20 TABLET, DELAYED RELEASE ORAL
Qty: 90 TABLET | Refills: 1 | Status: SHIPPED | OUTPATIENT
Start: 2024-01-02

## 2024-01-02 RX ORDER — VORTIOXETINE 10 MG/1
1 TABLET, FILM COATED ORAL
COMMUNITY
Start: 2017-04-01

## 2024-01-02 ASSESSMENT — PATIENT HEALTH QUESTIONNAIRE - PHQ9
SUM OF ALL RESPONSES TO PHQ QUESTIONS 1-9: 0
SUM OF ALL RESPONSES TO PHQ9 QUESTIONS 1 & 2: 0
SUM OF ALL RESPONSES TO PHQ QUESTIONS 1-9: 0
SUM OF ALL RESPONSES TO PHQ QUESTIONS 1-9: 0
2. FEELING DOWN, DEPRESSED OR HOPELESS: 0
SUM OF ALL RESPONSES TO PHQ QUESTIONS 1-9: 0
1. LITTLE INTEREST OR PLEASURE IN DOING THINGS: 0

## 2024-01-02 ASSESSMENT — ENCOUNTER SYMPTOMS
RESPIRATORY NEGATIVE: 1
ALLERGIC/IMMUNOLOGIC NEGATIVE: 1
EYES NEGATIVE: 1

## 2024-01-02 NOTE — PROGRESS NOTES
[facility] Neurology [address], [address]  [place] [address]  [phone]      Chief Complaint   Patient presents with    New Patient     NP - SYNCOPAL EPISODE       Anna Jackson is a 38 y.o. female who presents for syncopal episode.    PMH significant for anxiety, palpitations, pseudotumor cerebri. Followed by Dr. Immanuel Lewis.     She is here today by herself. RHF. Endorses syncopal episode while visiting with her family over the holidays. She noted prior to the event she had finished eating a bowel of soup, then proceeded to get up and go into the kitchen. While in the kitchen, developed a metallic taste in her mouth. Stated she started washing dishes, and shortly started feeling \"woozy\", off balance and light headed. She proceeded to sit down. While sitting down, she became flushed and diaphoretic. Associated with fluttering sensation in her epigastric region (described as angry butterflies) and nauseated. Stated she stood up, took 3 steps and loss consciousness. She does not recall how long she was unconscious for, believes to be 30 seconds to 1 minute. Denies biting her tongue, rhythmic jerking movements, bowel or bladder incontinence. Stated when she came too, she was slightly disoriented but quickly resolved and was back her baseline.     Endorses similar episode approximately 6 mths prior. However, stated she did not lose consciousness during that event.     Endorses chronic fatigue. Endorse snoring and daytime somnolence. No prior hx of sleep study or sleep apnea.     Endorses progressively worsening indigestion over the past few months. Minimal relief with OTC Pepcid and TUMS. She has tried diet modifications to see if this would help alleviate discomfort with no success.      Denies recent changes in medications. Endorses recent respiratory illness at beginning of December.     No family hx of seizure disorder or neurological disorder that she is aware of.     Hx of

## 2024-01-11 ENCOUNTER — HOSPITAL ENCOUNTER (OUTPATIENT)
Dept: MRI IMAGING | Age: 39
Discharge: HOME | End: 2024-01-14

## 2024-01-11 DIAGNOSIS — R55 SYNCOPE AND COLLAPSE: ICD-10-CM

## 2024-01-22 ENCOUNTER — INITIAL CONSULT (OUTPATIENT)
Age: 39
End: 2024-01-22
Payer: COMMERCIAL

## 2024-01-22 VITALS
SYSTOLIC BLOOD PRESSURE: 136 MMHG | HEIGHT: 66 IN | BODY MASS INDEX: 31.02 KG/M2 | HEART RATE: 75 BPM | DIASTOLIC BLOOD PRESSURE: 88 MMHG | WEIGHT: 193 LBS

## 2024-01-22 DIAGNOSIS — R07.89 CHEST DISCOMFORT: ICD-10-CM

## 2024-01-22 DIAGNOSIS — R55 SYNCOPE, UNSPECIFIED SYNCOPE TYPE: Primary | ICD-10-CM

## 2024-01-22 PROCEDURE — 99204 OFFICE O/P NEW MOD 45 MIN: CPT | Performed by: INTERNAL MEDICINE

## 2024-01-22 PROCEDURE — 93000 ELECTROCARDIOGRAM COMPLETE: CPT | Performed by: INTERNAL MEDICINE

## 2024-01-31 ASSESSMENT — ENCOUNTER SYMPTOMS
SHORTNESS OF BREATH: 0
ABDOMINAL PAIN: 0
ORTHOPNEA: 0

## 2024-01-31 NOTE — PROGRESS NOTES
[facility] CARDIOLOGY  [address] [place] [address], [address]  [place] [address]  PHONE: [phone]      24    NAME:  Anna Jackson  : 1985  MRN: [id number]         SUBJECTIVE:   Anna Jackson is a 38 y.o. female seen for a consultation visit regarding the following:     Chief Complaint   Patient presents with    Consultation            HPI:  Consultation is requested by Ada Hirsch MD for evaluation of Consultation   .    Ms. Jackson presents today for evaluation of a syncopal episode.  Patient was at home during holidays.  Was in the kitchen, developed a metallic taste in her mouth and subsequently felt extremely lightheaded and dizzy had brief loss of consciousness.  She has not had any syncope previously.  She does have a history of pseudotumor cerebri.  She was evaluated by primary care who ordered cardiology consultation as well as neuro eval for an EEG.  Patient had complained of some palpitations and heart racing at times.  Concern for possible cardiac etiology of her syncope.  Patient has no documented history of heart disease.  She denies chest pain, shortness of breath, orthopnea, PND or lower extremity swelling.  No recent hospitalizations.  No history of diabetes, hypertension or hyperlipidemia.  She is a former smoker quit several years ago.        Key CAD CHF Meds            acetaZOLAMIDE (DIAMOX) 500 MG extended release capsule (Taking)    Class: Historical Med          Key Antihyperglycemic Medications       Patient is on no antihyperglycemic meds.              Past Medical History, Past Surgical History, Family history, Social History, and Medications were all reviewed with the patient today and updated as necessary.     Prior to Admission medications    Medication Sig Start Date End Date Taking? Authorizing Provider   acetaZOLAMIDE (DIAMOX) 500 MG extended release capsule Take 1 capsule by mouth daily 22  Yes Provider, MD Temitope   ATIVAN 1 MG tablet

## 2024-03-01 ENCOUNTER — HOSPITAL ENCOUNTER (OUTPATIENT)
Dept: SLEEP CENTER | Age: 39
Discharge: HOME | End: 2024-03-04

## 2024-03-08 ENCOUNTER — OFFICE VISIT (OUTPATIENT)
Age: 39
End: 2024-03-08

## 2024-03-08 VITALS
HEIGHT: 66 IN | BODY MASS INDEX: 31.18 KG/M2 | DIASTOLIC BLOOD PRESSURE: 68 MMHG | HEART RATE: 80 BPM | WEIGHT: 194 LBS | SYSTOLIC BLOOD PRESSURE: 118 MMHG

## 2024-03-08 DIAGNOSIS — R07.89 CHEST DISCOMFORT: Primary | ICD-10-CM

## 2024-03-08 DIAGNOSIS — R55 SYNCOPE, UNSPECIFIED SYNCOPE TYPE: ICD-10-CM

## 2024-03-15 ASSESSMENT — ENCOUNTER SYMPTOMS
SHORTNESS OF BREATH: 0
ABDOMINAL PAIN: 0

## 2024-03-15 NOTE — PROGRESS NOTES
patient's care.  Please call or contact me if there are any questions or concerns regarding the above.      Jordon Galeano III, MD  03/15/24  1:13 PM

## 2024-03-26 ENCOUNTER — OFFICE VISIT (OUTPATIENT)
Dept: NEUROLOGY | Age: 39
End: 2024-03-26
Payer: COMMERCIAL

## 2024-03-26 VITALS
HEIGHT: 66 IN | HEART RATE: 92 BPM | WEIGHT: 193 LBS | DIASTOLIC BLOOD PRESSURE: 75 MMHG | SYSTOLIC BLOOD PRESSURE: 107 MMHG | OXYGEN SATURATION: 99 % | BODY MASS INDEX: 31.02 KG/M2

## 2024-03-26 DIAGNOSIS — F41.1 ANXIETY STATE: ICD-10-CM

## 2024-03-26 DIAGNOSIS — R55 SYNCOPE AND COLLAPSE: Primary | ICD-10-CM

## 2024-03-26 DIAGNOSIS — G93.2 PSEUDOTUMOR CEREBRI: ICD-10-CM

## 2024-03-26 DIAGNOSIS — R29.818 SUSPECTED SLEEP APNEA: ICD-10-CM

## 2024-03-26 PROCEDURE — 99214 OFFICE O/P EST MOD 30 MIN: CPT | Performed by: NURSE PRACTITIONER

## 2024-03-26 RX ORDER — ACETAZOLAMIDE 500 MG/1
500 CAPSULE, EXTENDED RELEASE ORAL DAILY
Qty: 60 CAPSULE | Refills: 5 | Status: SHIPPED | OUTPATIENT
Start: 2024-03-26 | End: 2024-03-26 | Stop reason: DRUGHIGH

## 2024-03-26 RX ORDER — VORTIOXETINE 10 MG/1
10 TABLET, FILM COATED ORAL DAILY
Qty: 30 TABLET | Refills: 5 | Status: SHIPPED | OUTPATIENT
Start: 2024-03-26

## 2024-03-26 RX ORDER — ACETAZOLAMIDE 250 MG/1
250 TABLET ORAL DAILY
Qty: 30 TABLET | Refills: 5 | Status: SHIPPED | OUTPATIENT
Start: 2024-03-26

## 2024-03-26 ASSESSMENT — PATIENT HEALTH QUESTIONNAIRE - PHQ9
2. FEELING DOWN, DEPRESSED OR HOPELESS: NOT AT ALL
SUM OF ALL RESPONSES TO PHQ QUESTIONS 1-9: 0
1. LITTLE INTEREST OR PLEASURE IN DOING THINGS: NOT AT ALL
SUM OF ALL RESPONSES TO PHQ QUESTIONS 1-9: 0
SUM OF ALL RESPONSES TO PHQ9 QUESTIONS 1 & 2: 0

## 2024-03-26 ASSESSMENT — ENCOUNTER SYMPTOMS
ALLERGIC/IMMUNOLOGIC NEGATIVE: 1
EYES NEGATIVE: 1
RESPIRATORY NEGATIVE: 1

## 2024-03-26 NOTE — PROGRESS NOTES
Language and speech are normal. On cranial nerve examination pupils are equal round and reactive to light. Fundoscopic examination is normal. Visual acuity is adequate. Visual fields are full to finger confrontation. Extraocular motility is normal. Face is symmetric and sensation is intact to light touch. Hearing is intact to finger rustle bilaterally. Motor examination - There is normal muscle tone and bulk. Power is full throughout. Muscle stretch reflexes are normoactive and there are no pathological reflexes present. Sensation is intact to light touch, pinprick, vibration and proprioception in all extremities. Cerebellar examination is normal. Gait and stance are normal.     MRI Result (most recent):  MRI BRAIN WO CONTRAST 01/11/2024    Narrative  MRI of the brain    INDICATION: Syncope    Standard MRI sequences were obtained through the brain in multiple planes  without IV contrast    COMPARISON:  None    FINDINGS:  There are no areas of significant signal abnormality in the brain.  There is no  evidence of acute hemorrhage or infarction.  The ventricles are normal in size.  There are no extra-axial fluid collections.  There are no intracranial masses.  There are no bony lesions.  The sinuses are clear.    Impression  No evidence of acute intracranial abnormality    Lab Results   Component Value Date    WBC 6.5 01/02/2024    HGB 13.8 01/02/2024    HCT 42.8 01/02/2024    MCV 93.7 01/02/2024     01/02/2024     Lab Results   Component Value Date     01/02/2024    K 4.1 01/02/2024     01/02/2024    CO2 21 01/02/2024    BUN 17 01/02/2024    CREATININE 0.80 01/02/2024    GLUCOSE 96 01/02/2024    CALCIUM 8.8 01/02/2024    PROT 7.4 01/02/2024    LABALBU 3.8 01/02/2024    BILITOT 0.3 01/02/2024    ALKPHOS 57 01/02/2024    AST 15 01/02/2024    ALT 17 01/02/2024    LABGLOM >60 01/02/2024    AGRATIO 1.1 01/02/2024    GLOB 3.6 01/02/2024           Component  Ref Range & Units 1/2/24 1239   TSH w Free

## 2024-03-27 ENCOUNTER — TELEPHONE (OUTPATIENT)
Dept: NEUROLOGY | Age: 39
End: 2024-03-27

## 2024-03-27 ENCOUNTER — OFFICE VISIT (OUTPATIENT)
Dept: NEUROLOGY | Age: 39
End: 2024-03-27

## 2024-03-27 DIAGNOSIS — R56.9 SEIZURE (HCC): Primary | ICD-10-CM

## 2024-03-27 DIAGNOSIS — R40.20 LOC (LOSS OF CONSCIOUSNESS) (HCC): Primary | ICD-10-CM

## 2024-03-27 RX ORDER — LEVETIRACETAM 500 MG/1
500 TABLET ORAL 2 TIMES DAILY
Qty: 60 TABLET | Refills: 3 | Status: SHIPPED | OUTPATIENT
Start: 2024-03-27

## 2024-03-27 NOTE — TELEPHONE ENCOUNTER
Spoke with patient. Discussed EEG results. Abnormal EEG with evidence of one  seizure event , generalized paroxysmal discharge during hyperventilation. Equivocal abnormality without a well localized focus.  Will start on Keppra 500 mg twice daily. Medication and side effects discussed. Also discussed seizure precautions with patient. Will have patient follow up primary neurologist, Dr. Lewis.     Seizure Precautions - discussed with patient    Please obey the following seizure precautions:  1. No driving until seizure free for 6 months, or cleared by a neurologist.  2. No tub baths, take showers instead.  3. No swimming unless there is someone immediately next to you  4. No working at heights, on roofs, climbing ladders, etc.  5. Use the back burns when cooking  6. Avoid open machinery  7. Avoid open flames unless someone is immediately nearby  8. In general avoid situations where someone could be injured in the event of a seizure.

## 2024-03-27 NOTE — PROGRESS NOTES
[facility] NEUROLOGY                                                   ELECTROENCEPHALOGRAM REPORT                                                           [address]  [place]    [address]                                                          [phone]       DATE: 03/27/2024          EEG Number:            Indication:  LOC / Syncopal episodes     referring::   Shania Nielsen NP    MEDICATIONS ::  none      Technique: This EEG was performed using the [employer] 10/20 System.  An EKG was monitored. The length of the recording was 30 minutes.    Normal stage II sleep .  Normal   awake.  Drowsy and asleep.       State of Consciousness:       10 / 20 IEP 21 channel [employer] equipment bipolar / referential recordings for 30 + minutes using standard montages and technique.   Background:  Normal.   10/11 Hz.      Rhythms:  Normal. Symmetric.    Epileptiform:  during  hyperventilation... a single 9.5 second EVENT at 10:30:26  with a few discharges after (? Occipital). No confusion. Non-convulsive.   Symmetry :  Normal.     Alpha:  8 hz = normal amount.  Normal attenuation with eye opening.     Beta:   13 + Hz and good amplitudes :  Normal amount.  Normal symmetry.    Theta:   5-7 Hz:  Normal amounts.     Delta:   2 - 3 hz:   Normal amounts.    No bizarre or unusual rhythms.          O2 Sat:  98 %             EKG::  70    Activation Procedures:  Hyperventlation: Performed for 3 minutes   (note the event above).  Photic Stimulation: Performed, no driving response    Interpretation:            Abnormal awake to sleeping EEG.                                           One  seizure event occurs.     Generalized paroxysmal discharge during hyperventilation. .                                                             Equivocal abnormality without a well localized focus.

## 2024-03-27 NOTE — TELEPHONE ENCOUNTER
Pt has been seen at [facility] office location and is a patient of Dr Lewis. Shania is asking if patient needs to be seen with Dr Lewis by opening a fu sooner or because of diagnosis to be seen with Dr Child

## 2024-04-09 ENCOUNTER — TELEPHONE (OUTPATIENT)
Age: 39
End: 2024-04-09

## 2024-04-09 NOTE — TELEPHONE ENCOUNTER
----- Message from Jordon Galeano III, MD sent at 4/8/2024 12:19 PM EDT -----  Please let patient know that recent stress test was normal. Patient can follow up as scheduled.

## 2024-04-10 ENCOUNTER — TELEPHONE (OUTPATIENT)
Dept: NEUROLOGY | Age: 39
End: 2024-04-10

## 2024-07-29 NOTE — PROGRESS NOTES
[facility] Neurology [address], [address]  [place] [address]  [phone]      Chief Complaint   Patient presents with    Follow-up    Seizures       Anna Jackson is a 39 y.o. female who presents for follow up for breakthrough seizure.     Interval history:    She is here today with her spouse. Since previous visit, she reported 1 seizure like event this past Friday. Stated prior to the event, she began to feel nauseous and shaky when she woke in the morning getting ready for work. She laid down for approximately 20-30 minutes, symptoms subsided. She took a shower, and symptoms returned.She got to work and developed pain her left temporal region and started trembling. Denies LOC, rhythmic jerkings, biting of her tongue, urinary or bowel incontinence. Stated this was not like previous episodes.      She is currently on Keppra 500 mg twice daily, however has only been taking 500 mg daily due to side effects.     She endorses changing her eating habits and exercising more regularly, since previous visit she has lost ~17 lbs. She feels this could have been related to low blood glucose. Stated she eats 1 meal a day and grazes with health snacks throughout the day. She continues to drink ~ oz water daily.     Hx of IIHTN- currently on diamox 250 mg daily. Denies headaches or changes in vision. She has noted that she has been experiencing frequent diarrhea since being on medication.     Denies recent illness, however noted that she has been around family members who have recently been dx with COVID. Denies fever, chills, myalgias, cough, or SOB.       Past Medical History:   Diagnosis Date    Anxiety     Benign intracranial hypertension        Past Surgical History:   Procedure Laterality Date    BREAST REDUCTION SURGERY         No family history on file.    Social History     Socioeconomic History    Marital status:    Tobacco Use    Smoking status: Former     Current

## 2024-07-30 ENCOUNTER — OFFICE VISIT (OUTPATIENT)
Dept: NEUROLOGY | Age: 39
End: 2024-07-30
Payer: COMMERCIAL

## 2024-07-30 VITALS
HEART RATE: 77 BPM | BODY MASS INDEX: 29.6 KG/M2 | SYSTOLIC BLOOD PRESSURE: 122 MMHG | HEIGHT: 66 IN | WEIGHT: 184.2 LBS | OXYGEN SATURATION: 99 % | DIASTOLIC BLOOD PRESSURE: 77 MMHG

## 2024-07-30 DIAGNOSIS — R56.9 SEIZURE (HCC): ICD-10-CM

## 2024-07-30 DIAGNOSIS — G93.2 PSEUDOTUMOR CEREBRI: ICD-10-CM

## 2024-07-30 DIAGNOSIS — R56.9 SEIZURE-LIKE ACTIVITY (HCC): Primary | ICD-10-CM

## 2024-07-30 PROBLEM — F41.1 ANXIETY STATE: Status: RESOLVED | Noted: 2017-03-23 | Resolved: 2024-07-30

## 2024-07-30 PROBLEM — F41.1 GAD (GENERALIZED ANXIETY DISORDER): Status: ACTIVE | Noted: 2017-03-23

## 2024-07-30 PROBLEM — H47.10 PAPILLEDEMA: Status: RESOLVED | Noted: 2022-10-25 | Resolved: 2024-07-30

## 2024-07-30 LAB
ANION GAP SERPL CALC-SCNC: 10 MMOL/L (ref 9–18)
BASOPHILS # BLD: 0.1 K/UL (ref 0–0.2)
BASOPHILS NFR BLD: 1 % (ref 0–2)
BUN SERPL-MCNC: 9 MG/DL (ref 6–23)
CALCIUM SERPL-MCNC: 9.2 MG/DL (ref 8.8–10.2)
CHLORIDE SERPL-SCNC: 108 MMOL/L (ref 98–107)
CO2 SERPL-SCNC: 22 MMOL/L (ref 20–28)
CREAT SERPL-MCNC: 0.77 MG/DL (ref 0.6–1.1)
DIFFERENTIAL METHOD BLD: NORMAL
EOSINOPHIL # BLD: 0.1 K/UL (ref 0–0.8)
EOSINOPHIL NFR BLD: 1 % (ref 0.5–7.8)
ERYTHROCYTE [DISTWIDTH] IN BLOOD BY AUTOMATED COUNT: 12.2 % (ref 11.9–14.6)
GLUCOSE SERPL-MCNC: 92 MG/DL (ref 70–99)
HCT VFR BLD AUTO: 45.4 % (ref 35.8–46.3)
HGB BLD-MCNC: 14.6 G/DL (ref 11.7–15.4)
IMM GRANULOCYTES # BLD AUTO: 0 K/UL (ref 0–0.5)
IMM GRANULOCYTES NFR BLD AUTO: 0 % (ref 0–5)
LYMPHOCYTES # BLD: 1.8 K/UL (ref 0.5–4.6)
LYMPHOCYTES NFR BLD: 27 % (ref 13–44)
MCH RBC QN AUTO: 30.4 PG (ref 26.1–32.9)
MCHC RBC AUTO-ENTMCNC: 32.2 G/DL (ref 31.4–35)
MCV RBC AUTO: 94.6 FL (ref 82–102)
MONOCYTES # BLD: 0.5 K/UL (ref 0.1–1.3)
MONOCYTES NFR BLD: 7 % (ref 4–12)
NEUTS SEG # BLD: 4.1 K/UL (ref 1.7–8.2)
NEUTS SEG NFR BLD: 64 % (ref 43–78)
NRBC # BLD: 0 K/UL (ref 0–0.2)
PLATELET # BLD AUTO: 266 K/UL (ref 150–450)
PMV BLD AUTO: 11.4 FL (ref 9.4–12.3)
POTASSIUM SERPL-SCNC: 4 MMOL/L (ref 3.5–5.1)
RBC # BLD AUTO: 4.8 M/UL (ref 4.05–5.2)
SODIUM SERPL-SCNC: 139 MMOL/L (ref 136–145)
WBC # BLD AUTO: 6.5 K/UL (ref 4.3–11.1)

## 2024-07-30 PROCEDURE — 99214 OFFICE O/P EST MOD 30 MIN: CPT | Performed by: NURSE PRACTITIONER

## 2024-07-30 RX ORDER — LEVETIRACETAM 500 MG/1
1000 TABLET, FILM COATED, EXTENDED RELEASE ORAL NIGHTLY
Qty: 60 TABLET | Refills: 5 | Status: SHIPPED | OUTPATIENT
Start: 2024-07-30

## 2024-07-30 RX ORDER — LEVETIRACETAM 500 MG/1
500 TABLET ORAL 2 TIMES DAILY
Qty: 60 TABLET | Refills: 3 | Status: CANCELLED | OUTPATIENT
Start: 2024-07-30

## 2024-07-30 ASSESSMENT — PATIENT HEALTH QUESTIONNAIRE - PHQ9
SUM OF ALL RESPONSES TO PHQ QUESTIONS 1-9: 0
SUM OF ALL RESPONSES TO PHQ QUESTIONS 1-9: 0
1. LITTLE INTEREST OR PLEASURE IN DOING THINGS: NOT AT ALL
SUM OF ALL RESPONSES TO PHQ9 QUESTIONS 1 & 2: 0
SUM OF ALL RESPONSES TO PHQ QUESTIONS 1-9: 0
SUM OF ALL RESPONSES TO PHQ QUESTIONS 1-9: 0
2. FEELING DOWN, DEPRESSED OR HOPELESS: NOT AT ALL

## 2024-07-30 ASSESSMENT — ENCOUNTER SYMPTOMS
ALLERGIC/IMMUNOLOGIC NEGATIVE: 1
RESPIRATORY NEGATIVE: 1
DIARRHEA: 1
EYES NEGATIVE: 1

## 2024-08-09 DIAGNOSIS — R56.9 SEIZURE (HCC): Primary | ICD-10-CM

## 2024-08-09 RX ORDER — LEVETIRACETAM 500 MG/1
500 TABLET ORAL 2 TIMES DAILY
Qty: 180 TABLET | Refills: 1 | Status: SHIPPED | OUTPATIENT
Start: 2024-08-09

## 2024-08-09 NOTE — PROGRESS NOTES
Unable to tolerate Keppra ER 1000 mg due to being to sedated in AM. Patient would like to retry Keppra 500 mg twice daily.

## 2024-10-01 ENCOUNTER — OFFICE VISIT (OUTPATIENT)
Dept: NEUROLOGY | Age: 39
End: 2024-10-01
Payer: COMMERCIAL

## 2024-10-01 VITALS
DIASTOLIC BLOOD PRESSURE: 74 MMHG | HEIGHT: 66 IN | HEART RATE: 75 BPM | SYSTOLIC BLOOD PRESSURE: 114 MMHG | OXYGEN SATURATION: 98 % | WEIGHT: 173 LBS | BODY MASS INDEX: 27.8 KG/M2

## 2024-10-01 DIAGNOSIS — Z76.0 MEDICATION REFILL: ICD-10-CM

## 2024-10-01 DIAGNOSIS — R56.9 SEIZURE (HCC): Primary | ICD-10-CM

## 2024-10-01 DIAGNOSIS — G93.2 PSEUDOTUMOR CEREBRI: ICD-10-CM

## 2024-10-01 DIAGNOSIS — F41.1 ANXIETY STATE: ICD-10-CM

## 2024-10-01 DIAGNOSIS — G43.009 MIGRAINE WITHOUT AURA AND WITHOUT STATUS MIGRAINOSUS, NOT INTRACTABLE: ICD-10-CM

## 2024-10-01 PROCEDURE — 99214 OFFICE O/P EST MOD 30 MIN: CPT | Performed by: NURSE PRACTITIONER

## 2024-10-01 RX ORDER — LEVETIRACETAM 500 MG/1
500 TABLET ORAL 2 TIMES DAILY
Qty: 180 TABLET | Refills: 1 | Status: SHIPPED | OUTPATIENT
Start: 2024-10-01

## 2024-10-01 RX ORDER — VORTIOXETINE 10 MG/1
10 TABLET, FILM COATED ORAL DAILY
Qty: 30 TABLET | Refills: 5 | Status: SHIPPED | OUTPATIENT
Start: 2024-10-01

## 2024-10-01 RX ORDER — RIMEGEPANT SULFATE 75 MG/75MG
75 TABLET, ORALLY DISINTEGRATING ORAL DAILY PRN
Qty: 16 TABLET | Refills: 11 | Status: SHIPPED | OUTPATIENT
Start: 2024-10-01

## 2024-10-01 RX ORDER — ACETAZOLAMIDE 250 MG/1
250 TABLET ORAL DAILY
Qty: 90 TABLET | Refills: 2 | Status: SHIPPED | OUTPATIENT
Start: 2024-10-01

## 2024-10-01 ASSESSMENT — PATIENT HEALTH QUESTIONNAIRE - PHQ9
SUM OF ALL RESPONSES TO PHQ9 QUESTIONS 1 & 2: 0
1. LITTLE INTEREST OR PLEASURE IN DOING THINGS: NOT AT ALL
SUM OF ALL RESPONSES TO PHQ QUESTIONS 1-9: 0
2. FEELING DOWN, DEPRESSED OR HOPELESS: NOT AT ALL
SUM OF ALL RESPONSES TO PHQ QUESTIONS 1-9: 0

## 2024-10-01 ASSESSMENT — ENCOUNTER SYMPTOMS
EYES NEGATIVE: 1
DIARRHEA: 1
RESPIRATORY NEGATIVE: 1
ALLERGIC/IMMUNOLOGIC NEGATIVE: 1

## 2024-10-01 NOTE — PROGRESS NOTES
[facility]    Intimate Partner Violence     Fear of Current or Ex-Partner: Not asked     Emotionally Abused: Not asked     Physically Abused: Not asked     Sexually Abused: Not asked   Housing Stability: Not At Risk (3/9/2022)    Received from [facility]    Housing Stability     Was there a time when you did not have a steady place to sleep: Not asked     Worried that the place you are staying is making you sick: Not asked         Current Outpatient Medications:     levETIRAcetam (KEPPRA) 500 MG tablet, Take 1 tablet by mouth 2 times daily, Disp: 180 tablet, Rfl: 1    TRINTELLIX 10 MG TABS tablet, Take 1 tablet by mouth daily, Disp: 30 tablet, Rfl: 5    acetaZOLAMIDE (DIAMOX) 250 MG tablet, Take 1 tablet by mouth daily, Disp: 30 tablet, Rfl: 5    medroxyPROGESTERone Acetate (DEPO-PROVERA IM), Inject into the muscle Q 3 months, Disp: , Rfl:     Allergies   Allergen Reactions    Penicillins Other (See Comments)     Can take Z-pack ok  Can take Z-pack ok      Codeine Nausea And Vomiting, Dizziness or Vertigo and Itching       Review of Systems   Constitutional: Negative.    HENT: Negative.     Eyes: Negative.    Respiratory: Negative.     Cardiovascular: Negative.    Gastrointestinal:  Positive for diarrhea.   Endocrine: Negative.    Genitourinary: Negative.    Musculoskeletal: Negative.    Skin: Negative.    Allergic/Immunologic: Negative.    Neurological:  Positive for seizures.   Hematological: Negative.    Psychiatric/Behavioral:  Negative for dysphoric mood, sleep disturbance and suicidal ideas. The patient is nervous/anxious (personal hx).          Physical Examination  /74   Pulse 75   Ht 1.676 m (5' 6\")   Wt 78.5 kg (173 lb)   SpO2 98%   BMI 27.92 kg/m²     General - Well developed, well nourished, in no apparent distress. Pleasant and conversent.   HEENT - Normocephalic, atraumatic. Conjunctiva, tympanic membranes, and oropharynx are clear.   Neck - Supple without masses, no bruits

## 2024-11-11 DIAGNOSIS — G43.009 MIGRAINE WITHOUT AURA AND WITHOUT STATUS MIGRAINOSUS, NOT INTRACTABLE: ICD-10-CM

## 2024-11-11 DIAGNOSIS — G93.2 PSEUDOTUMOR CEREBRI: Primary | ICD-10-CM

## 2024-11-11 RX ORDER — ACETAZOLAMIDE 250 MG/1
250 TABLET ORAL DAILY
Qty: 90 TABLET | Refills: 2 | Status: SHIPPED | OUTPATIENT
Start: 2024-11-11

## 2024-11-11 RX ORDER — RIMEGEPANT SULFATE 75 MG/75MG
75 TABLET, ORALLY DISINTEGRATING ORAL DAILY PRN
Qty: 16 TABLET | Refills: 11 | Status: SHIPPED | OUTPATIENT
Start: 2024-11-11

## 2025-01-08 ENCOUNTER — OFFICE VISIT (OUTPATIENT)
Dept: NEUROLOGY | Age: 40
End: 2025-01-08
Payer: COMMERCIAL

## 2025-01-08 DIAGNOSIS — G93.2 PSEUDOTUMOR CEREBRI: ICD-10-CM

## 2025-01-08 DIAGNOSIS — R56.9 SEIZURE (HCC): ICD-10-CM

## 2025-01-08 DIAGNOSIS — G43.709 CHRONIC MIGRAINE WITHOUT AURA WITHOUT STATUS MIGRAINOSUS, NOT INTRACTABLE: Primary | ICD-10-CM

## 2025-01-08 PROCEDURE — 99214 OFFICE O/P EST MOD 30 MIN: CPT | Performed by: PSYCHIATRY & NEUROLOGY

## 2025-01-08 RX ORDER — ACETAZOLAMIDE 250 MG/1
250 TABLET ORAL 2 TIMES DAILY
Qty: 180 TABLET | Refills: 3 | Status: SHIPPED | OUTPATIENT
Start: 2025-01-08

## 2025-01-08 RX ORDER — LEVETIRACETAM 500 MG/1
500 TABLET ORAL 2 TIMES DAILY
Qty: 180 TABLET | Refills: 1 | Status: SHIPPED | OUTPATIENT
Start: 2025-01-08

## 2025-01-08 ASSESSMENT — ENCOUNTER SYMPTOMS
GASTROINTESTINAL NEGATIVE: 1
ALLERGIC/IMMUNOLOGIC NEGATIVE: 1
RESPIRATORY NEGATIVE: 1

## 2025-01-08 NOTE — PROGRESS NOTES
[facility] NEUROLOGY  [address] [place] [address], [address]  [place] [address]  Phone: [phone] Fax [phone]  Dr. Immanuel Lewis      1/8/2025  Annajohn Jade Manuel     Patient is referred by the following provider for consultation regarding as below:       I reviewed the available records and notes and have examined patient with the following findings:     Chief Complaint:  No chief complaint on file.         HPI: This is a right handed 39 y.o.  female who is very pleasant very appropriate patient who last evaluated by me was on 2022.  She has been followed by my partners.  In October 2021 she had a breast reduction and post breast reduction she had severe headaches and visual changes that showed papilledema on eye evaluations right worse than left with Dr. Wilcox at [facility] has been watching her.  But has not seen her in quite some time.  She was started on Diamox 500 mg twice a day and drop to once a day.  We did do an MRV showing a right transverse sigmoid sinus high-grade stenosis.  So far she has not been seen by endovascular.  We went ahead and we also did EEGs that showed subclinical seizure activity.  But then she actually developed seizures.  And then breakthrough seizures she was started on Keppra and put on 500 mg twice a day it was very hard for her to remember the morning dose she was missing it but now was taking a little bit more regular.  Her abnormal EEG was done on 3-.  She does have a history of having some syncopal episodes with metallic taste and at this time we do think those were seizure related.  And what she calls \"angry butterflies\" and fluttering in her chest and stomach.  That is prior to her losing consciousness.  She has a cardiologist and her heart is doing well.  She has PACs and PVCs.  Her last seizure was in the September 2023.    IMAGING REVIEW:  I REVIEWED PERTINENT  IMAGES AND REPORTS WITH THE PATIENT PERSONALLY, DIRECTLY AND FULLY.     Past Medical